# Patient Record
Sex: MALE | Race: WHITE | NOT HISPANIC OR LATINO | URBAN - METROPOLITAN AREA
[De-identification: names, ages, dates, MRNs, and addresses within clinical notes are randomized per-mention and may not be internally consistent; named-entity substitution may affect disease eponyms.]

---

## 2018-11-15 ENCOUNTER — EMERGENCY (EMERGENCY)
Facility: HOSPITAL | Age: 43
LOS: 1 days | Discharge: HOME | End: 2018-11-15
Attending: EMERGENCY MEDICINE | Admitting: EMERGENCY MEDICINE
Payer: COMMERCIAL

## 2018-11-15 VITALS
HEART RATE: 71 BPM | DIASTOLIC BLOOD PRESSURE: 72 MMHG | RESPIRATION RATE: 16 BRPM | TEMPERATURE: 98 F | OXYGEN SATURATION: 98 % | SYSTOLIC BLOOD PRESSURE: 112 MMHG

## 2018-11-15 VITALS
DIASTOLIC BLOOD PRESSURE: 88 MMHG | HEART RATE: 62 BPM | TEMPERATURE: 98 F | HEIGHT: 74 IN | SYSTOLIC BLOOD PRESSURE: 149 MMHG | RESPIRATION RATE: 18 BRPM | WEIGHT: 199.96 LBS | OXYGEN SATURATION: 98 %
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
## 2018-11-15 DIAGNOSIS — S06.0X0A CONCUSSION WITHOUT LOSS OF CONSCIOUSNESS, INITIAL ENCOUNTER: ICD-10-CM
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
## 2018-11-15 DIAGNOSIS — Z79.82 LONG TERM (CURRENT) USE OF ASPIRIN: ICD-10-CM

## 2018-11-15 DIAGNOSIS — S09.90XA UNSPECIFIED INJURY OF HEAD, INITIAL ENCOUNTER: ICD-10-CM

## 2018-11-15 DIAGNOSIS — E78.5 HYPERLIPIDEMIA, UNSPECIFIED: ICD-10-CM

## 2018-11-15 PROCEDURE — 82962 GLUCOSE BLOOD TEST: CPT

## 2018-11-15 PROCEDURE — 99284 EMERGENCY DEPT VISIT MOD MDM: CPT | Mod: 25

## 2018-11-15 PROCEDURE — 70450 CT HEAD/BRAIN W/O DYE: CPT

## 2018-11-15 PROCEDURE — 70450 CT HEAD/BRAIN W/O DYE: CPT | Mod: 26

## 2018-11-15 PROCEDURE — 99284 EMERGENCY DEPT VISIT MOD MDM: CPT

## 2018-11-15 RX ORDER — ACETAMINOPHEN 500 MG
975 TABLET ORAL ONCE
Qty: 0 | Refills: 0 | Status: COMPLETED | OUTPATIENT
Start: 2018-11-15 | End: 2018-11-15

## 2018-11-15 RX ORDER — METOCLOPRAMIDE HCL 10 MG
10 TABLET ORAL ONCE
Qty: 0 | Refills: 0 | Status: COMPLETED | OUTPATIENT
Start: 2018-11-15 | End: 2018-11-15

## 2018-11-15 RX ADMIN — Medication 975 MILLIGRAM(S): at 10:22

## 2018-11-15 RX ADMIN — Medication 10 MILLIGRAM(S): at 10:23
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
## 2018-11-15 NOTE — ED ADULT NURSE NOTE - NSIMPLEMENTINTERV_GEN_ALL_ED
Implemented All Universal Safety Interventions:  [place] to call system. Call bell, personal items and telephone within reach. Instruct patient to call for assistance. Room bathroom lighting operational. Non-slip footwear when patient is off stretcher. Physically safe environment: no spills, clutter or unnecessary equipment. Stretcher in lowest position, wheels locked, appropriate side rails in place.
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
## 2018-11-15 NOTE — ED ADULT NURSE NOTE - CHPI ED NUR SYMPTOMS NEG
no dizziness/no weakness/no loss of consciousness/no nausea/no numbness/no vomiting/no change in level of consciousness/no confusion/no fever
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
## 2018-11-15 NOTE — ED PROVIDER NOTE - MEDICAL DECISION MAKING DETAILS
HDS and comfortable in ED w/out physical exam evidence of trauma, negative head CT. Neuro intact. HA aborted in ED. Safe to d/c home with return precautions.

## 2018-11-15 NOTE — ED PROVIDER NOTE - PHYSICAL EXAMINATION
VITAL SIGNS: I have reviewed nursing notes and confirm.  CONSTITUTIONAL: Well-developed; well-nourished; in no acute distress.  SKIN: Agree with RN documentation regarding decubitus evaluation. Remainder of skin exam is warm and dry, no acute rash.  HEAD: Normocephalic; atraumatic.  EYES: PERRL, EOM intact; conjunctiva and sclera clear.  ENT: No nasal discharge; airway clear.  NECK: Supple; non tender.  CARD: S1, S2 normal; no murmurs, gallops, or rubs. Regular rate and rhythm.  RESP: No wheezes, rales or rhonchi.  ABD: Normal bowel sounds; soft; non-distended; non-tender; no hepatosplenomegaly.  EXT: Normal ROM. No clubbing, cyanosis or edema.  LYMPH: No acute cervical adenopathy.  NEURO: Alert, oriented. CN 2-12 intact b/l, gait intact, 5/5 strength all ext, no sensory deficits, speech intact. Grossly unremarkable.  PSYCH: Cooperative, appropriate.

## 2019-01-09 PROBLEM — Z00.00 ENCOUNTER FOR PREVENTIVE HEALTH EXAMINATION: Status: ACTIVE | Noted: 2019-01-09

## 2019-01-10 ENCOUNTER — APPOINTMENT (OUTPATIENT)
Dept: SURGERY | Facility: CLINIC | Age: 44
End: 2019-01-10

## 2019-01-10 ENCOUNTER — OUTPATIENT (OUTPATIENT)
Dept: OUTPATIENT SERVICES | Facility: HOSPITAL | Age: 44
LOS: 1 days | End: 2019-01-10
Payer: COMMERCIAL

## 2019-01-10 DIAGNOSIS — Z01.818 ENCOUNTER FOR OTHER PREPROCEDURAL EXAMINATION: ICD-10-CM

## 2019-01-10 LAB
ALBUMIN SERPL ELPH-MCNC: 4.9 G/DL — SIGNIFICANT CHANGE UP (ref 3.3–5)
ALP SERPL-CCNC: 74 U/L — SIGNIFICANT CHANGE UP (ref 40–120)
ALT FLD-CCNC: 72 U/L — HIGH (ref 10–45)
ANION GAP SERPL CALC-SCNC: 12 MMOL/L — SIGNIFICANT CHANGE UP (ref 5–17)
APPEARANCE UR: CLEAR — SIGNIFICANT CHANGE UP
APTT BLD: 28.7 SEC — SIGNIFICANT CHANGE UP (ref 27.5–36.3)
AST SERPL-CCNC: 43 U/L — HIGH (ref 10–40)
BILIRUB SERPL-MCNC: 1.9 MG/DL — HIGH (ref 0.2–1.2)
BILIRUB UR-MCNC: NEGATIVE — SIGNIFICANT CHANGE UP
BUN SERPL-MCNC: 13 MG/DL — SIGNIFICANT CHANGE UP (ref 7–23)
CALCIUM SERPL-MCNC: 10.1 MG/DL — SIGNIFICANT CHANGE UP (ref 8.4–10.5)
CHLORIDE SERPL-SCNC: 101 MMOL/L — SIGNIFICANT CHANGE UP (ref 96–108)
CO2 SERPL-SCNC: 28 MMOL/L — SIGNIFICANT CHANGE UP (ref 22–31)
COLOR SPEC: YELLOW — SIGNIFICANT CHANGE UP
CREAT SERPL-MCNC: 0.88 MG/DL — SIGNIFICANT CHANGE UP (ref 0.5–1.3)
DIFF PNL FLD: NEGATIVE — SIGNIFICANT CHANGE UP
GLUCOSE SERPL-MCNC: 89 MG/DL — SIGNIFICANT CHANGE UP (ref 70–99)
GLUCOSE UR QL: NEGATIVE — SIGNIFICANT CHANGE UP
HCT VFR BLD CALC: 45.2 % — SIGNIFICANT CHANGE UP (ref 39–50)
HGB BLD-MCNC: 15.4 G/DL — SIGNIFICANT CHANGE UP (ref 13–17)
INR BLD: 1.01 — SIGNIFICANT CHANGE UP (ref 0.88–1.16)
KETONES UR-MCNC: NEGATIVE — SIGNIFICANT CHANGE UP
LEUKOCYTE ESTERASE UR-ACNC: NEGATIVE — SIGNIFICANT CHANGE UP
MCHC RBC-ENTMCNC: 31.8 PG — SIGNIFICANT CHANGE UP (ref 27–34)
MCHC RBC-ENTMCNC: 34.1 G/DL — SIGNIFICANT CHANGE UP (ref 32–36)
MCV RBC AUTO: 93.2 FL — SIGNIFICANT CHANGE UP (ref 80–100)
NITRITE UR-MCNC: NEGATIVE — SIGNIFICANT CHANGE UP
PH UR: 7.5 — SIGNIFICANT CHANGE UP (ref 5–8)
PLATELET # BLD AUTO: 338 K/UL — SIGNIFICANT CHANGE UP (ref 150–400)
POTASSIUM SERPL-MCNC: 4.1 MMOL/L — SIGNIFICANT CHANGE UP (ref 3.5–5.3)
POTASSIUM SERPL-SCNC: 4.1 MMOL/L — SIGNIFICANT CHANGE UP (ref 3.5–5.3)
PROT SERPL-MCNC: 8 G/DL — SIGNIFICANT CHANGE UP (ref 6–8.3)
PROT UR-MCNC: NEGATIVE MG/DL — SIGNIFICANT CHANGE UP
PROTHROM AB SERPL-ACNC: 11.4 SEC — SIGNIFICANT CHANGE UP (ref 10–12.9)
RBC # BLD: 4.85 M/UL — SIGNIFICANT CHANGE UP (ref 4.2–5.8)
RBC # FLD: 12.1 % — SIGNIFICANT CHANGE UP (ref 10.3–16.9)
SODIUM SERPL-SCNC: 141 MMOL/L — SIGNIFICANT CHANGE UP (ref 135–145)
SP GR SPEC: 1.02 — SIGNIFICANT CHANGE UP (ref 1–1.03)
UROBILINOGEN FLD QL: 0.2 E.U./DL — SIGNIFICANT CHANGE UP
WBC # BLD: 7.6 K/UL — SIGNIFICANT CHANGE UP (ref 3.8–10.5)
WBC # FLD AUTO: 7.6 K/UL — SIGNIFICANT CHANGE UP (ref 3.8–10.5)

## 2019-01-10 PROCEDURE — 93010 ELECTROCARDIOGRAM REPORT: CPT

## 2019-01-25 ENCOUNTER — RESULT REVIEW (OUTPATIENT)
Age: 44
End: 2019-01-25

## 2019-01-25 ENCOUNTER — OUTPATIENT (OUTPATIENT)
Dept: OUTPATIENT SERVICES | Facility: HOSPITAL | Age: 44
LOS: 1 days | Discharge: HOME | End: 2019-01-25

## 2019-01-28 LAB
SURGICAL PATHOLOGY STUDY: SIGNIFICANT CHANGE UP
SURGICAL PATHOLOGY STUDY: SIGNIFICANT CHANGE UP

## 2019-08-11 NOTE — ED ADULT TRIAGE NOTE - NS ED NURSE DIRECT TO ROOM YN
David Hobbs is a 78 y.o. male  who presents by EMS to ER with c/o Patient presents with:  Diarrhea  Fatigue  Patient presents with complaints of diarrhea since yesterday, with associated weakness and fatigue. Patient reports 10+ bowel movements over the last 24 hours. Patient denies blood in stool, denies abdominal pain. Patient denies nausea or vomiting, denies sick contacts, or suspicious food. He specifically denies any fevers, chills, nausea, vomiting, chest pain, shortness of breath, headache, rash, diarrhea, abdominal pain, urinary changes, sweating or weight loss. PCP: Jalen Dawn MD   PMHx significant for: Past Medical History:  No date: CAD (coronary artery disease)  No date: Cancer [facility])      Comment:  prostate  No date: Hypercholesterolemia  No date: Hypertension   PSHx significant for: Past Surgical History:  : ENDOSCOPY, COLON, DIAGNOSTIC      Comment:  Isa Santiago -- 5 year fu  No date: HX CATARACT REMOVAL      Comment:  Left and Right  No date: HX COLONOSCOPY      Comment:  2016 Dr. Alexander Seat  2001: [place] : HX HERNIA REPAIR  : HX PROSTATECTOMY  No date: HX TONSILLECTOMY  Social Hx: Tobacco use: Social History    Tobacco Use      Smoking status: Former Smoker        Quit date: 1970        Years since quittin.1      Smokeless tobacco: Never Used  ; EtOH use: The patient states he drinks 0 per week.; Illicit Drug use: Allergies:   -- Sulfa (Sulfonamide Antibiotics) -- Unknown (comments)    There are no other complaints, changes or physical findings at this time.               Past Medical History:   Diagnosis Date    CAD (coronary artery disease)     Cancer ([facility] [address]     prostate    Hypercholesterolemia     Hypertension        Past Surgical History:   Procedure Laterality Date    ENDOSCOPY, COLON, DIAGNOSTIC      Marianna -- 5 year fu    HX CATARACT REMOVAL      Left and Right    HX COLONOSCOPY      2016 Dr. Trace Maxwell CORONARY ARTERY BYPASS GRAFT  2001    HX HERNIA REPAIR  ,     HX PROSTATECTOMY      HX TONSILLECTOMY           Family History:   Problem Relation Age of Onset    Cancer Sister         Brain    Stroke Brother     Cancer Brother         colon    Diabetes Brother     Cancer Mother         Colon    Heart Disease Father        Social History     Socioeconomic History    Marital status:      Spouse name: Not on file    Number of children: Not on file    Years of education: Not on file    Highest education level: Not on file   Occupational History    Not on file   Social Needs    Financial resource strain: Not on file    Food insecurity:     Worry: Not on file     Inability: Not on file    Transportation needs:     Medical: Not on file     Non-medical: Not on file   Tobacco Use    Smoking status: Former Smoker     Last attempt to quit: 1970     Years since quittin.1    Smokeless tobacco: Never Used   Substance and Sexual Activity    Alcohol use:  Yes     Alcohol/week: 5.8 standard drinks     Types: 7 Glasses of wine per week     Comment: red wine one glass/night    Drug use: No    Sexual activity: Not Currently   Lifestyle    Physical activity:     Days per week: Not on file     Minutes per session: Not on file    Stress: Not on file   Relationships    Social connections:     Talks on phone: Not on file     Gets together: Not on file     Attends Baptism service: Not on file     Active member of club or organization: Not on file     Attends meetings of clubs or organizations: Not on file     Relationship status: Not on file    Intimate partner violence:     Fear of current or ex partner: Not on file     Emotionally abused: Not on file     Physically abused: Not on file     Forced sexual activity: Not on file   Other Topics Concern    Not on file   Social History Narrative    Not on file         ALLERGIES: Sulfa (sulfonamide antibiotics)    Review of Systems Constitutional: Negative for activity change, appetite change, chills and fever. HENT: Negative for congestion and sore throat. Respiratory: Negative for cough and shortness of breath. Cardiovascular: Negative for chest pain. Gastrointestinal: Negative for abdominal pain, diarrhea, nausea and vomiting. Genitourinary: Negative for dysuria. Musculoskeletal: Negative for arthralgias and myalgias. Skin: Negative for color change. Neurological: Positive for weakness. Negative for dizziness. Psychiatric/Behavioral: The patient is not nervous/anxious. All other systems reviewed and are negative. Vitals:    08/11/19 1815 08/11/19 1845 08/11/19 1900 08/11/19 1915   BP: 190/73 191/74 185/80 192/73   Pulse:       Resp:       Temp:       SpO2: 97% 98% 98% 97%   Weight:       Height:                Physical Exam   Constitutional: He is oriented to person, place, and time. He appears well-developed and well-nourished. Patient is well-appearing, in no acute distress. HENT:   Head: Normocephalic and atraumatic. Right Ear: External ear normal.   Left Ear: External ear normal.   Mouth/Throat: Oropharynx is clear and moist. No oropharyngeal exudate. Eyes: Pupils are equal, round, and reactive to light. Conjunctivae and EOM are normal. Right eye exhibits no discharge. Left eye exhibits no discharge. No scleral icterus. Neck: Normal range of motion. Neck supple. No tracheal deviation present. No thyromegaly present. Cardiovascular: Normal rate, regular rhythm, normal heart sounds and intact distal pulses. No murmur heard. Pulmonary/Chest: Effort normal and breath sounds normal. No respiratory distress. He has no wheezes. He has no rales. Abdominal: Soft. Bowel sounds are normal. He exhibits no distension. There is no tenderness. There is no rebound and no guarding. Abdomen soft nontender, normal bowel sounds in all 4 quadrants.   Nonsurgical abdominal exam.   Musculoskeletal: Normal range of motion. He exhibits no edema or tenderness. Lymphadenopathy:     He has no cervical adenopathy. Neurological: He is alert and oriented to person, place, and time. No cranial nerve deficit. Coordination normal.   Skin: Skin is warm. No rash noted. No erythema. Psychiatric: He has a normal mood and affect. His behavior is normal. Judgment and thought content normal.   Nursing note and vitals reviewed. MDM  Number of Diagnoses or Management Options  Diarrhea, unspecified type:   Hyponatremia:   Diagnosis management comments:   Patient is being admitted to the hospital.  The results of their tests and reasons for their admission have been discussed with them and/or available family. They convey agreement and understanding for the need to be admitted and for their admission diagnosis. Consultation has been made with the inpatient physician specialist for hospitalization.     LABORATORY TESTS:  Recent Results (from the past 12 hour(s))  -CBC WITH AUTOMATED DIFF  Collection Time: 08/11/19  6:14 PM       Result                      Value             Ref Range           WBC                         10.1              4.1 - 11.1 K*       RBC                         4.76              4.10 - 5.70 *       HGB                         13.7              12.1 - 17.0 *       HCT                         38.4              36.6 - 50.3 %       MCV                         80.7              80.0 - 99.0 *       MCH                         28.8              26.0 - 34.0 *       MCHC                        35.7              30.0 - 36.5 *       RDW                         13.0              11.5 - 14.5 %       PLATELET                    287               150 - 400 K/*       MPV                         8.7 (L)           8.9 - 12.9 FL       NRBC                        0.0               0  WBC       ABSOLUTE NRBC               0.00              0.00 - 0.01 *       NEUTROPHILS                 78 (H)            32 - 75 % LYMPHOCYTES                 14                12 - 49 %           MONOCYTES                   8                 5 - 13 %            EOSINOPHILS                 0                 0 - 7 %             BASOPHILS                   0                 0 - 1 %             IMMATURE GRANULOCYTES       0                 0.0 - 0.5 %         ABS. NEUTROPHILS            7.8               1.8 - 8.0 K/*       ABS. LYMPHOCYTES            1.4               0.8 - 3.5 K/*       ABS. MONOCYTES              0.8               0.0 - 1.0 K/*       ABS. EOSINOPHILS            0.0               0.0 - 0.4 K/*       ABS. BASOPHILS              0.0               0.0 - 0.1 K/*       ABS. IMM. GRANS.            0.0               0.00 - 0.04 *       DF                          AUTOMATED                        -METABOLIC PANEL, COMPREHENSIVE  Collection Time: 08/11/19  6:44 PM       Result                      Value             Ref Range           Sodium                      119 (LL)          136 - 145 mm*       Potassium                   4.3               3.5 - 5.1 mm*       Chloride                    88 (L)            97 - 108 mmo*       CO2                         22                21 - 32 mmol*       Anion gap                   9                 5 - 15 mmol/L       Glucose                     87                65 - 100 mg/*       BUN                         19                6 - 20 MG/DL        Creatinine                  1.12              0.70 - 1.30 *       BUN/Creatinine ratio        17                12 - 20             GFR est AA                  >60               >60 ml/min/1*       GFR est non-AA              >60               >60 ml/min/1*       Calcium                     7.9 (L)           8.5 - 10.1 M*       Bilirubin, total            0.5               0.2 - 1.0 MG*       ALT (SGPT)                  15                12 - 78 U/L         AST (SGOT)                  17                15 - 37 U/L         Alk.  phosphatase            82 No 45 - 117 U/L        Protein, total              6.8               6.4 - 8.2 g/*       Albumin                     3.5               3.5 - 5.0 g/*       Globulin                    3.3               2.0 - 4.0 g/*       A-G Ratio                   1.1               1.1 - 2.2      -LIPASE  Collection Time: 08/11/19  6:44 PM       Result                      Value             Ref Range           Lipase                      265               73 - 393 U/L   -SAMPLES BEING HELD  Collection Time: 08/11/19  6:44 PM       Result                      Value             Ref Range           SAMPLES BEING HELD          LAV                                   COMMENT                                                       Add-on orders for these samples will be processed based on acceptable specimen integrity and analyte stability, which may vary by analyte.   -METABOLIC PANEL, BASIC  Collection Time: 08/11/19  9:56 PM       Result                      Value             Ref Range           Sodium                      121 (L)           136 - 145 mm*       Potassium                   4.2               3.5 - 5.1 mm*       Chloride                    90 (L)            97 - 108 mmo*       CO2                         21                21 - 32 mmol*       Anion gap                   10                5 - 15 mmol/L       Glucose                     98                65 - 100 mg/*       BUN                         17                6 - 20 MG/DL        Creatinine                  1.07              0.70 - 1.30 *       BUN/Creatinine ratio        16                12 - 20             GFR est AA                  >60               >60 ml/min/1*       GFR est non-AA              >60               >60 ml/min/1*       Calcium                     8.1 (L)           8.5 - 10.1 M*    IMAGING RESULTS:  See chart    MEDICATIONS GIVEN:  Medications  sodium chloride 0.9 % bolus infusion 500 mL (500 mL IntraVENous New Bag 8/11/19 2960)  0.9% sodium chloride infusion (125 mL/hr IntraVENous New Bag 8/11/19 2244)  sodium chloride (NS) flush 5-40 mL (10 mL IntraVENous Given 8/11/19 2251)  sodium chloride (NS) flush 5-40 mL (has no administration in time range)  ondansetron (ZOFRAN) injection 4 mg (has no administration in time range)  enoxaparin (LOVENOX) injection 40 mg (40 mg SubCUTAneous Given 8/11/19 2250)   levothyroxine (SYNTHROID) tablet 112 mcg (has no administration in time range)  aspirin delayed-release tablet 81 mg (has no administration in time range)  loratadine (CLARITIN) tablet 10 mg (has no administration in time range)  ondansetron (ZOFRAN) injection 4 mg (4 mg IntraVENous Given 8/11/19 1822)  hydrALAZINE (APRESOLINE) 20 mg/mL injection 10 mg (10 mg IntraVENous Given 8/11/19 1941)  iopamidol (ISOVUE-370) 76 % injection 100 mL (100 mL IntraVENous Given 8/11/19 2104)    IMPRESSION:  Hyponatremia  (primary encounter diagnosis)  Diarrhea, unspecified type    PLAN:  1. Admit to hospital for further evaluation and treatment. Procedures               CONSULT NOTE:   7:45 PM  Fernando Swan PA-C spoke with Family practice resident,   Specialty: Family practice  Discussed pt's hx, disposition, and available diagnostic and imaging results. Reviewed care plans. Consultant agrees with plans as outlined. Will see for admission.
